# Patient Record
Sex: FEMALE | Race: WHITE | NOT HISPANIC OR LATINO
[De-identification: names, ages, dates, MRNs, and addresses within clinical notes are randomized per-mention and may not be internally consistent; named-entity substitution may affect disease eponyms.]

---

## 2017-01-08 ENCOUNTER — RX RENEWAL (OUTPATIENT)
Age: 57
End: 2017-01-08

## 2017-01-09 ENCOUNTER — RX RENEWAL (OUTPATIENT)
Age: 57
End: 2017-01-09

## 2017-01-17 ENCOUNTER — RX RENEWAL (OUTPATIENT)
Age: 57
End: 2017-01-17

## 2017-02-02 ENCOUNTER — APPOINTMENT (OUTPATIENT)
Dept: NEPHROLOGY | Facility: CLINIC | Age: 57
End: 2017-02-02

## 2017-02-02 VITALS — DIASTOLIC BLOOD PRESSURE: 60 MMHG | HEART RATE: 80 BPM | WEIGHT: 125.4 LBS | SYSTOLIC BLOOD PRESSURE: 126 MMHG

## 2017-02-15 ENCOUNTER — RX RENEWAL (OUTPATIENT)
Age: 57
End: 2017-02-15

## 2017-02-22 ENCOUNTER — RX RENEWAL (OUTPATIENT)
Age: 57
End: 2017-02-22

## 2017-03-01 ENCOUNTER — RX RENEWAL (OUTPATIENT)
Age: 57
End: 2017-03-01

## 2017-03-01 RX ORDER — DIAZEPAM 10 MG/1
10 TABLET ORAL
Refills: 0 | Status: DISCONTINUED | COMMUNITY
End: 2017-03-01

## 2017-03-01 RX ORDER — ZOLPIDEM TARTRATE 5 MG/1
5 TABLET, FILM COATED ORAL
Refills: 0 | Status: DISCONTINUED | COMMUNITY
End: 2017-03-01

## 2017-03-07 ENCOUNTER — RX RENEWAL (OUTPATIENT)
Age: 57
End: 2017-03-07

## 2017-03-07 RX ORDER — ZOLPIDEM TARTRATE 5 MG/1
5 TABLET, FILM COATED ORAL
Refills: 0 | Status: DISCONTINUED | COMMUNITY
End: 2017-03-07

## 2017-03-07 RX ORDER — DIAZEPAM 5 MG/1
5 TABLET ORAL
Refills: 0 | Status: DISCONTINUED | COMMUNITY
End: 2017-03-07

## 2017-04-25 ENCOUNTER — RX RENEWAL (OUTPATIENT)
Age: 57
End: 2017-04-25

## 2017-05-10 ENCOUNTER — RX RENEWAL (OUTPATIENT)
Age: 57
End: 2017-05-10

## 2017-06-16 ENCOUNTER — MEDICATION RENEWAL (OUTPATIENT)
Age: 57
End: 2017-06-16

## 2017-06-16 ENCOUNTER — RX RENEWAL (OUTPATIENT)
Age: 57
End: 2017-06-16

## 2017-07-14 ENCOUNTER — OUTPATIENT (OUTPATIENT)
Dept: OUTPATIENT SERVICES | Facility: HOSPITAL | Age: 57
LOS: 1 days | End: 2017-07-14
Payer: COMMERCIAL

## 2017-07-14 ENCOUNTER — APPOINTMENT (OUTPATIENT)
Dept: NEPHROLOGY | Facility: CLINIC | Age: 57
End: 2017-07-14

## 2017-07-14 VITALS — DIASTOLIC BLOOD PRESSURE: 68 MMHG | SYSTOLIC BLOOD PRESSURE: 128 MMHG | HEART RATE: 82 BPM

## 2017-07-14 DIAGNOSIS — Z00.00 ENCOUNTER FOR GENERAL ADULT MEDICAL EXAMINATION W/OUT ABNORMAL FINDINGS: ICD-10-CM

## 2017-07-14 PROCEDURE — 73700 CT LOWER EXTREMITY W/O DYE: CPT | Mod: 26,LT

## 2017-07-14 PROCEDURE — 73700 CT LOWER EXTREMITY W/O DYE: CPT

## 2017-07-14 RX ORDER — MUPIROCIN 20 MG/G
2 OINTMENT TOPICAL
Qty: 22 | Refills: 0 | Status: DISCONTINUED | COMMUNITY
Start: 2017-05-25

## 2017-07-14 RX ORDER — AMOXICILLIN AND CLAVULANATE POTASSIUM 875; 125 MG/1; MG/1
875-125 TABLET, COATED ORAL
Qty: 20 | Refills: 0 | Status: DISCONTINUED | COMMUNITY
Start: 2017-06-07

## 2017-07-14 RX ORDER — FINASTERIDE 5 MG/1
5 TABLET, FILM COATED ORAL
Refills: 0 | Status: DISCONTINUED | COMMUNITY
End: 2017-07-14

## 2017-07-20 LAB
ALBUMIN SERPL ELPH-MCNC: 4.1 G/DL
ALP BLD-CCNC: 68 U/L
ALT SERPL-CCNC: 6 U/L
ANION GAP SERPL CALC-SCNC: 17 MMOL/L
APPEARANCE: CLEAR
AST SERPL-CCNC: 11 U/L
BACTERIA: NEGATIVE
BASOPHILS # BLD AUTO: 0.03 K/UL
BASOPHILS NFR BLD AUTO: 0.4 %
BILIRUB SERPL-MCNC: 0.3 MG/DL
BILIRUBIN URINE: NEGATIVE
BLOOD URINE: NEGATIVE
BUN SERPL-MCNC: 20 MG/DL
CALCIUM SERPL-MCNC: 9.6 MG/DL
CALCIUM SERPL-MCNC: 9.6 MG/DL
CHLORIDE SERPL-SCNC: 97 MMOL/L
CHOLEST SERPL-MCNC: 169 MG/DL
CHOLEST/HDLC SERPL: 2.2 RATIO
CO2 SERPL-SCNC: 20 MMOL/L
COLOR: YELLOW
CREAT SERPL-MCNC: 1.58 MG/DL
CREAT SPEC-SCNC: 44 MG/DL
CREAT/PROT UR: 0.3 RATIO
EOSINOPHIL # BLD AUTO: 0.08 K/UL
EOSINOPHIL NFR BLD AUTO: 1 %
FERRITIN SERPL-MCNC: 65 NG/ML
FRUCTOSAMINE SERPL-MCNC: 337 UMOL/L
GLUCOSE QUALITATIVE U: NORMAL MG/DL
GLUCOSE SERPL-MCNC: 49 MG/DL
HBA1C MFR BLD HPLC: 6.2 %
HCT VFR BLD CALC: 38.2 %
HDLC SERPL-MCNC: 76 MG/DL
HGB BLD-MCNC: 12.8 G/DL
HYALINE CASTS: 2 /LPF
IMM GRANULOCYTES NFR BLD AUTO: 0.1 %
INR PPP: 0.88 RATIO
IRON SATN MFR SERPL: 28 %
IRON SERPL-MCNC: 79 UG/DL
KETONES URINE: NEGATIVE
LDLC SERPL CALC-MCNC: 81 MG/DL
LEUKOCYTE ESTERASE URINE: NEGATIVE
LYMPHOCYTES # BLD AUTO: 2.14 K/UL
LYMPHOCYTES NFR BLD AUTO: 27 %
MAGNESIUM SERPL-MCNC: 2.1 MG/DL
MAN DIFF?: NORMAL
MCHC RBC-ENTMCNC: 30.5 PG
MCHC RBC-ENTMCNC: 33.5 GM/DL
MCV RBC AUTO: 91.2 FL
MICROSCOPIC-UA: NORMAL
MONOCYTES # BLD AUTO: 0.51 K/UL
MONOCYTES NFR BLD AUTO: 6.4 %
NEUTROPHILS # BLD AUTO: 5.17 K/UL
NEUTROPHILS NFR BLD AUTO: 65.1 %
NITRITE URINE: NEGATIVE
PARATHYROID HORMONE INTACT: 32 PG/ML
PH URINE: 6.5
PHOSPHATE SERPL-MCNC: 3.4 MG/DL
PLATELET # BLD AUTO: 512 K/UL
POTASSIUM SERPL-SCNC: 5.3 MMOL/L
PROT SERPL-MCNC: 7.2 G/DL
PROT UR-MCNC: 13 MG/DL
PROTEIN URINE: NEGATIVE MG/DL
PT BLD: 9.9 SEC
RBC # BLD: 4.19 M/UL
RBC # FLD: 12.6 %
RED BLOOD CELLS URINE: 1 /HPF
SODIUM SERPL-SCNC: 134 MMOL/L
SPECIFIC GRAVITY URINE: 1.01
SQUAMOUS EPITHELIAL CELLS: 2 /HPF
TIBC SERPL-MCNC: 278 UG/DL
TRIGL SERPL-MCNC: 61 MG/DL
TSH SERPL-ACNC: 3.93 UIU/ML
UIBC SERPL-MCNC: 199 UG/DL
URATE SERPL-MCNC: 3.5 MG/DL
UROBILINOGEN URINE: NORMAL MG/DL
WBC # FLD AUTO: 7.94 K/UL
WHITE BLOOD CELLS URINE: 1 /HPF

## 2017-07-28 ENCOUNTER — OUTPATIENT (OUTPATIENT)
Dept: OUTPATIENT SERVICES | Facility: HOSPITAL | Age: 57
LOS: 1 days | Discharge: HOME | End: 2017-07-28

## 2017-08-01 LAB
24R-OH-CALCIDIOL SERPL-MCNC: 45 PG/ML
25(OH)D3 SERPL-MCNC: 54.3 NG/ML

## 2017-08-04 DIAGNOSIS — N18.3 CHRONIC KIDNEY DISEASE, STAGE 3 (MODERATE): ICD-10-CM

## 2017-08-04 DIAGNOSIS — Z79.82 LONG TERM (CURRENT) USE OF ASPIRIN: ICD-10-CM

## 2017-08-04 DIAGNOSIS — I73.9 PERIPHERAL VASCULAR DISEASE, UNSPECIFIED: ICD-10-CM

## 2017-08-04 DIAGNOSIS — F41.8 OTHER SPECIFIED ANXIETY DISORDERS: ICD-10-CM

## 2017-08-04 DIAGNOSIS — E10.319 TYPE 1 DIABETES MELLITUS WITH UNSPECIFIED DIABETIC RETINOPATHY WITHOUT MACULAR EDEMA: ICD-10-CM

## 2017-08-04 DIAGNOSIS — M25.775 OSTEOPHYTE, LEFT FOOT: ICD-10-CM

## 2017-08-04 DIAGNOSIS — I12.9 HYPERTENSIVE CHRONIC KIDNEY DISEASE WITH STAGE 1 THROUGH STAGE 4 CHRONIC KIDNEY DISEASE, OR UNSPECIFIED CHRONIC KIDNEY DISEASE: ICD-10-CM

## 2017-08-04 DIAGNOSIS — E10.22 TYPE 1 DIABETES MELLITUS WITH DIABETIC CHRONIC KIDNEY DISEASE: ICD-10-CM

## 2017-08-04 DIAGNOSIS — E10.610 TYPE 1 DIABETES MELLITUS WITH DIABETIC NEUROPATHIC ARTHROPATHY: ICD-10-CM

## 2017-08-04 DIAGNOSIS — Z79.4 LONG TERM (CURRENT) USE OF INSULIN: ICD-10-CM

## 2017-10-30 ENCOUNTER — RX RENEWAL (OUTPATIENT)
Age: 57
End: 2017-10-30

## 2017-10-30 DIAGNOSIS — S30.821A: ICD-10-CM

## 2017-10-30 DIAGNOSIS — L08.9: ICD-10-CM

## 2017-11-20 ENCOUNTER — APPOINTMENT (OUTPATIENT)
Dept: NEPHROLOGY | Facility: CLINIC | Age: 57
End: 2017-11-20
Payer: COMMERCIAL

## 2017-11-20 VITALS — DIASTOLIC BLOOD PRESSURE: 62 MMHG | HEART RATE: 72 BPM | SYSTOLIC BLOOD PRESSURE: 124 MMHG

## 2017-11-20 PROCEDURE — 99214 OFFICE O/P EST MOD 30 MIN: CPT

## 2017-12-14 ENCOUNTER — RX RENEWAL (OUTPATIENT)
Age: 57
End: 2017-12-14

## 2017-12-19 LAB
24R-OH-CALCIDIOL SERPL-MCNC: 45.1 PG/ML
25(OH)D3 SERPL-MCNC: 41.2 NG/ML
ALBUMIN SERPL ELPH-MCNC: 4.2 G/DL
ALP BLD-CCNC: 64 U/L
ALT SERPL-CCNC: 7 U/L
ANION GAP SERPL CALC-SCNC: 13 MMOL/L
APPEARANCE: CLEAR
AST SERPL-CCNC: 15 U/L
BACTERIA: NEGATIVE
BASOPHILS # BLD AUTO: 0.01 K/UL
BASOPHILS NFR BLD AUTO: 0.1 %
BILIRUB SERPL-MCNC: 0.4 MG/DL
BILIRUBIN URINE: NEGATIVE
BLOOD URINE: NEGATIVE
BUN SERPL-MCNC: 23 MG/DL
CALCIUM SERPL-MCNC: 9.8 MG/DL
CALCIUM SERPL-MCNC: 9.8 MG/DL
CHLORIDE SERPL-SCNC: 97 MMOL/L
CHOLEST SERPL-MCNC: 169 MG/DL
CHOLEST/HDLC SERPL: 2.1 RATIO
CO2 SERPL-SCNC: 26 MMOL/L
COLOR: YELLOW
CREAT SERPL-MCNC: 1.36 MG/DL
CREAT SPEC-SCNC: 37 MG/DL
CREAT/PROT UR: 0.3 RATIO
EOSINOPHIL # BLD AUTO: 0.09 K/UL
EOSINOPHIL NFR BLD AUTO: 1.3
FERRITIN SERPL-MCNC: 47 NG/ML
GLUCOSE QUALITATIVE U: NEGATIVE MG/DL
GLUCOSE SERPL-MCNC: 70 MG/DL
HCT VFR BLD CALC: 37.5 %
HCYS SERPL-MCNC: 14.4 UMOL/L
HDLC SERPL-MCNC: 79 MG/DL
HGB BLD-MCNC: 12.6 G/DL
HYALINE CASTS: 1 /LPF
IMM GRANULOCYTES NFR BLD AUTO: 0.1 %
IRON SATN MFR SERPL: 33 %
IRON SERPL-MCNC: 96 UG/DL
KETONES URINE: NEGATIVE
LDLC SERPL CALC-MCNC: 75 MG/DL
LEUKOCYTE ESTERASE URINE: NEGATIVE
LYMPHOCYTES # BLD AUTO: 1.97 K/UL
LYMPHOCYTES NFR BLD AUTO: 27.5 %
MAGNESIUM SERPL-MCNC: 2 MG/DL
MAN DIFF?: NORMAL
MCHC RBC-ENTMCNC: 30.7 PG
MCHC RBC-ENTMCNC: 33.6 GM/DL
MCV RBC AUTO: 91.5 FL
MICROSCOPIC-UA: NORMAL
MONOCYTES # BLD AUTO: 0.55 K/UL
MONOCYTES NFR BLD AUTO: 7.7 %
NEUTROPHILS # BLD AUTO: 4.53 K/UL
NEUTROPHILS NFR BLD AUTO: 63.3 %
NITRITE URINE: NEGATIVE
PARATHYROID HORMONE INTACT: 22 PG/ML
PH URINE: 6.5
PHOSPHATE SERPL-MCNC: 4.5 MG/DL
PLATELET # BLD AUTO: 450 K/UL
POTASSIUM SERPL-SCNC: 4.8 MMOL/L
PROT SERPL-MCNC: 7.2 G/DL
PROT UR-MCNC: 10 MG/DL
PROTEIN URINE: NEGATIVE MG/DL
RBC # BLD: 4.1 M/UL
RBC # FLD: 12.9 %
RED BLOOD CELLS URINE: 2 /HPF
SODIUM SERPL-SCNC: 136 MMOL/L
SPECIFIC GRAVITY URINE: 1.01
SQUAMOUS EPITHELIAL CELLS: 2 /HPF
TIBC SERPL-MCNC: 289 UG/DL
TRIGL SERPL-MCNC: 73 MG/DL
TSH SERPL-ACNC: 2.65 UIU/ML
UIBC SERPL-MCNC: 193 UG/DL
URATE SERPL-MCNC: 3.4 MG/DL
UROBILINOGEN URINE: NEGATIVE MG/DL
WBC # FLD AUTO: 7.16 K/UL
WHITE BLOOD CELLS URINE: 3 /HPF

## 2018-02-28 ENCOUNTER — RX RENEWAL (OUTPATIENT)
Age: 58
End: 2018-02-28

## 2018-03-01 ENCOUNTER — RX RENEWAL (OUTPATIENT)
Age: 58
End: 2018-03-01

## 2018-03-02 ENCOUNTER — OTHER (OUTPATIENT)
Age: 58
End: 2018-03-02

## 2018-03-23 ENCOUNTER — APPOINTMENT (OUTPATIENT)
Dept: NEPHROLOGY | Facility: CLINIC | Age: 58
End: 2018-03-23
Payer: COMMERCIAL

## 2018-03-23 VITALS — DIASTOLIC BLOOD PRESSURE: 62 MMHG | SYSTOLIC BLOOD PRESSURE: 120 MMHG | HEART RATE: 76 BPM

## 2018-03-23 DIAGNOSIS — L64.9 ANDROGENIC ALOPECIA, UNSPECIFIED: ICD-10-CM

## 2018-03-23 DIAGNOSIS — G62.9 POLYNEUROPATHY, UNSPECIFIED: ICD-10-CM

## 2018-03-23 PROCEDURE — 99214 OFFICE O/P EST MOD 30 MIN: CPT

## 2018-03-23 RX ORDER — DOXYCYCLINE HYCLATE 100 MG/1
100 CAPSULE ORAL
Qty: 28 | Refills: 1 | Status: DISCONTINUED | COMMUNITY
Start: 2017-10-30 | End: 2018-03-23

## 2018-05-25 ENCOUNTER — APPOINTMENT (OUTPATIENT)
Dept: ENDOCRINOLOGY | Facility: CLINIC | Age: 58
End: 2018-05-25
Payer: COMMERCIAL

## 2018-05-25 VITALS
WEIGHT: 125 LBS | DIASTOLIC BLOOD PRESSURE: 72 MMHG | SYSTOLIC BLOOD PRESSURE: 128 MMHG | HEART RATE: 90 BPM | BODY MASS INDEX: 22.15 KG/M2 | HEIGHT: 63 IN

## 2018-05-25 PROCEDURE — 99204 OFFICE O/P NEW MOD 45 MIN: CPT

## 2018-05-25 RX ORDER — INSULIN GLARGINE 100 [IU]/ML
100 INJECTION, SOLUTION SUBCUTANEOUS
Qty: 1 | Refills: 5 | Status: DISCONTINUED | COMMUNITY
Start: 2018-05-25 | End: 2018-05-25

## 2018-05-31 ENCOUNTER — APPOINTMENT (OUTPATIENT)
Dept: PLASTIC SURGERY | Facility: CLINIC | Age: 58
End: 2018-05-31
Payer: COMMERCIAL

## 2018-05-31 VITALS — WEIGHT: 125 LBS | BODY MASS INDEX: 22.15 KG/M2 | HEIGHT: 63 IN

## 2018-05-31 DIAGNOSIS — Z86.39 PERSONAL HISTORY OF OTHER ENDOCRINE, NUTRITIONAL AND METABOLIC DISEASE: ICD-10-CM

## 2018-05-31 DIAGNOSIS — S81.801A UNSPECIFIED OPEN WOUND, RIGHT LOWER LEG, INITIAL ENCOUNTER: ICD-10-CM

## 2018-05-31 PROCEDURE — 99201 OFFICE OUTPATIENT NEW 10 MINUTES: CPT

## 2018-06-06 ENCOUNTER — APPOINTMENT (OUTPATIENT)
Dept: ENDOCRINOLOGY | Facility: CLINIC | Age: 58
End: 2018-06-06

## 2018-06-14 ENCOUNTER — APPOINTMENT (OUTPATIENT)
Dept: PLASTIC SURGERY | Facility: CLINIC | Age: 58
End: 2018-06-14
Payer: COMMERCIAL

## 2018-06-14 VITALS — WEIGHT: 125 LBS | HEIGHT: 63 IN | BODY MASS INDEX: 22.15 KG/M2

## 2018-06-14 PROCEDURE — 99212 OFFICE O/P EST SF 10 MIN: CPT

## 2018-07-05 ENCOUNTER — APPOINTMENT (OUTPATIENT)
Dept: PLASTIC SURGERY | Facility: CLINIC | Age: 58
End: 2018-07-05
Payer: COMMERCIAL

## 2018-07-05 PROCEDURE — 99212 OFFICE O/P EST SF 10 MIN: CPT

## 2018-09-06 ENCOUNTER — APPOINTMENT (OUTPATIENT)
Dept: PLASTIC SURGERY | Facility: CLINIC | Age: 58
End: 2018-09-06

## 2018-09-17 ENCOUNTER — APPOINTMENT (OUTPATIENT)
Dept: NEPHROLOGY | Facility: CLINIC | Age: 58
End: 2018-09-17
Payer: COMMERCIAL

## 2018-09-17 VITALS — HEART RATE: 80 BPM | DIASTOLIC BLOOD PRESSURE: 68 MMHG | SYSTOLIC BLOOD PRESSURE: 114 MMHG

## 2018-09-17 DIAGNOSIS — E10.8 TYPE 1 DIABETES MELLITUS WITH UNSPECIFIED COMPLICATIONS: ICD-10-CM

## 2018-09-17 PROCEDURE — 99214 OFFICE O/P EST MOD 30 MIN: CPT

## 2018-09-17 RX ORDER — INSULIN GLARGINE 100 [IU]/ML
100 INJECTION, SOLUTION SUBCUTANEOUS
Qty: 1 | Refills: 5 | Status: DISCONTINUED | COMMUNITY
Start: 2018-05-25 | End: 2018-09-17

## 2018-10-16 ENCOUNTER — OUTPATIENT (OUTPATIENT)
Dept: OUTPATIENT SERVICES | Facility: HOSPITAL | Age: 58
LOS: 1 days | End: 2018-10-16
Payer: COMMERCIAL

## 2018-10-16 ENCOUNTER — APPOINTMENT (OUTPATIENT)
Dept: INFUSION THERAPY | Facility: HOSPITAL | Age: 58
End: 2018-10-16

## 2018-10-16 ENCOUNTER — APPOINTMENT (OUTPATIENT)
Dept: ENDOCRINOLOGY | Facility: CLINIC | Age: 58
End: 2018-10-16
Payer: COMMERCIAL

## 2018-10-16 VITALS
SYSTOLIC BLOOD PRESSURE: 132 MMHG | TEMPERATURE: 98 F | HEART RATE: 85 BPM | RESPIRATION RATE: 16 BRPM | DIASTOLIC BLOOD PRESSURE: 71 MMHG | OXYGEN SATURATION: 99 %

## 2018-10-16 VITALS
SYSTOLIC BLOOD PRESSURE: 124 MMHG | HEART RATE: 86 BPM | DIASTOLIC BLOOD PRESSURE: 73 MMHG | HEIGHT: 63 IN | WEIGHT: 119.05 LBS | RESPIRATION RATE: 18 BRPM | TEMPERATURE: 99 F | OXYGEN SATURATION: 100 %

## 2018-10-16 VITALS
HEART RATE: 88 BPM | BODY MASS INDEX: 21.09 KG/M2 | HEIGHT: 63 IN | SYSTOLIC BLOOD PRESSURE: 128 MMHG | WEIGHT: 119 LBS | DIASTOLIC BLOOD PRESSURE: 68 MMHG

## 2018-10-16 DIAGNOSIS — D50.9 IRON DEFICIENCY ANEMIA, UNSPECIFIED: ICD-10-CM

## 2018-10-16 DIAGNOSIS — Z23 ENCOUNTER FOR IMMUNIZATION: ICD-10-CM

## 2018-10-16 PROCEDURE — 90686 IIV4 VACC NO PRSV 0.5 ML IM: CPT

## 2018-10-16 PROCEDURE — 96366 THER/PROPH/DIAG IV INF ADDON: CPT

## 2018-10-16 PROCEDURE — 99214 OFFICE O/P EST MOD 30 MIN: CPT | Mod: 25

## 2018-10-16 PROCEDURE — G0008: CPT

## 2018-10-16 PROCEDURE — 96365 THER/PROPH/DIAG IV INF INIT: CPT

## 2018-10-16 RX ORDER — IRON DEXTRAN COMPLEX 100 MG/2ML
975 VIAL (ML) INJECTION ONCE
Qty: 0 | Refills: 0 | Status: COMPLETED | OUTPATIENT
Start: 2018-10-16 | End: 2018-10-16

## 2018-10-16 RX ORDER — INFLUENZA A VIRUS A/VICTORIA/4897/2022 IVR-238 (H1N1) ANTIGEN (FORMALDEHYDE INACTIVATED), INFLUENZA A VIRUS A/DARWIN/9/2021 SAN-010 (H3N2) ANTIGEN (FORMALDEHYDE INACTIVATED), INFLUENZA B VIRUS B/PHUKET/3073/2013 ANTIGEN (FORMALDEHYDE INACTIVATED), AND INFLUENZA B VIRUS B/MICHIGAN/01/2021 ANTIGEN (FORMALDEHYDE INACTIVATED) 15; 15; 15; 15 UG/.5ML; UG/.5ML; UG/.5ML; UG/.5ML
0.5 INJECTION, SUSPENSION INTRAMUSCULAR
Qty: 1 | Refills: 0 | Status: ACTIVE | COMMUNITY
Start: 2018-10-16

## 2018-10-16 RX ORDER — IRON DEXTRAN COMPLEX 100 MG/2ML
25 VIAL (ML) INJECTION ONCE
Qty: 0 | Refills: 0 | Status: COMPLETED | OUTPATIENT
Start: 2018-10-16 | End: 2018-10-16

## 2018-10-16 RX ADMIN — Medication 173.17 MILLIGRAM(S): at 14:49

## 2018-10-16 RX ADMIN — Medication 1002 MILLIGRAM(S): at 13:55

## 2018-10-25 DIAGNOSIS — K90.9 INTESTINAL MALABSORPTION, UNSPECIFIED: ICD-10-CM

## 2018-11-29 ENCOUNTER — MEDICATION RENEWAL (OUTPATIENT)
Age: 58
End: 2018-11-29

## 2018-11-29 RX ORDER — BLOOD SUGAR DIAGNOSTIC
STRIP MISCELLANEOUS
Qty: 300 | Refills: 5 | Status: DISCONTINUED | COMMUNITY
Start: 2018-10-16 | End: 2018-11-29

## 2018-11-29 RX ORDER — BLOOD SUGAR DIAGNOSTIC
STRIP MISCELLANEOUS
Qty: 300 | Refills: 5 | Status: DISCONTINUED | COMMUNITY
Start: 2017-01-17 | End: 2018-11-29

## 2018-12-04 ENCOUNTER — MEDICATION RENEWAL (OUTPATIENT)
Age: 58
End: 2018-12-04

## 2018-12-24 PROBLEM — L64.9 ANDROGENIC ALOPECIA: Status: ACTIVE | Noted: 2018-03-24

## 2019-01-10 ENCOUNTER — OTHER (OUTPATIENT)
Age: 59
End: 2019-01-10

## 2019-01-14 ENCOUNTER — OTHER (OUTPATIENT)
Age: 59
End: 2019-01-14

## 2019-01-14 ENCOUNTER — MEDICATION RENEWAL (OUTPATIENT)
Age: 59
End: 2019-01-14

## 2019-03-06 ENCOUNTER — MEDICATION RENEWAL (OUTPATIENT)
Age: 59
End: 2019-03-06

## 2019-03-13 ENCOUNTER — RX RENEWAL (OUTPATIENT)
Age: 59
End: 2019-03-13

## 2019-03-19 ENCOUNTER — APPOINTMENT (OUTPATIENT)
Dept: ENDOCRINOLOGY | Facility: CLINIC | Age: 59
End: 2019-03-19
Payer: COMMERCIAL

## 2019-03-19 ENCOUNTER — APPOINTMENT (OUTPATIENT)
Dept: NEPHROLOGY | Facility: CLINIC | Age: 59
End: 2019-03-19
Payer: COMMERCIAL

## 2019-03-19 VITALS — SYSTOLIC BLOOD PRESSURE: 128 MMHG | DIASTOLIC BLOOD PRESSURE: 60 MMHG | HEART RATE: 90 BPM

## 2019-03-19 VITALS
HEART RATE: 92 BPM | DIASTOLIC BLOOD PRESSURE: 69 MMHG | HEIGHT: 63 IN | WEIGHT: 119 LBS | SYSTOLIC BLOOD PRESSURE: 130 MMHG | BODY MASS INDEX: 21.09 KG/M2

## 2019-03-19 VITALS — SYSTOLIC BLOOD PRESSURE: 126 MMHG | DIASTOLIC BLOOD PRESSURE: 58 MMHG

## 2019-03-19 PROCEDURE — 99214 OFFICE O/P EST MOD 30 MIN: CPT

## 2019-03-19 PROCEDURE — 99215 OFFICE O/P EST HI 40 MIN: CPT

## 2019-03-19 NOTE — DATA REVIEWED
[FreeTextEntry1] : 3/19: A1c 6.2%, tot chol 172, HDL 77, trig 54, LDL 82, urine microalbumin/cr 83, Cr 1.21\par 7/18: A1c 6.5%, Cr 1.25, tot chol 198, HDL 81, , trig 46, TSH 2.51, ferritin 13\par 4/18: Cr 1.48, Ca 9.9\par 7/17: A1c 6.2%

## 2019-03-19 NOTE — PHYSICAL EXAM
[General Appearance - Alert] : alert [General Appearance - In No Acute Distress] : in no acute distress [Sclera] : the sclera and conjunctiva were normal [Extraocular Movements] : extraocular movements were intact [Outer Ear] : the ears and nose were normal in appearance [Examination Of The Oral Cavity] : the lips and gums were normal [Neck Appearance] : the appearance of the neck was normal [Neck Cervical Mass (___cm)] : no neck mass was observed [Jugular Venous Distention Increased] : there was no jugular-venous distention [Auscultation Breath Sounds / Voice Sounds] : lungs were clear to auscultation bilaterally [Heart Rate And Rhythm] : heart rate was normal and rhythm regular [Heart Sounds] : normal S1 and S2 [Heart Sounds Gallop] : no gallops [Heart Sounds Pericardial Friction Rub] : no pericardial rub [Cervical Lymph Nodes Enlarged Anterior Bilaterally] : anterior cervical [Supraclavicular Lymph Nodes Enlarged Bilaterally] : supraclavicular [No CVA Tenderness] : no ~M costovertebral angle tenderness [No Spinal Tenderness] : no spinal tenderness [Abnormal Walk] : normal gait [] : no rash [Oriented To Time, Place, And Person] : oriented to person, place, and time [Impaired Insight] : insight and judgment were intact [Affect] : the affect was normal [FreeTextEntry1] : reduced sensation feet

## 2019-03-19 NOTE — HISTORY OF PRESENT ILLNESS
[FreeTextEntry1] : was in [facility] ED in December with hypoglycemia.  Since then she let her glucose run 40-50 mg/dl higher than usual and she is regaining some hypoglycemia awareness.  has hypo symptoms when glucose in the 50s.\myah has not had any hypo "pass outs" recently\myah wearing Mariusz CGM, she finds it 10-15 mg/dl lower than her glucometer.  was able to get the 14 day reader at a [facility] in [place] but her [facility] doesn't have the 14d sensors.\par Mariusz CGM reviewed:\par 14d avg   122 .  12a-6a 121;; 6a-12p  138; 12p-6p  121; 6p-12a 101\par 30d avg   125 .  12a-6a 130 ; 6a-12p  138; 12p-6p  118; 6p-12a 111\par range is mostly .  still having hypoglycemia multiple times per week.  But she says the hypo or "LO" reading on Mariusz is not always corroborated with her glucometer.  still testing 10x/day with glucometer because she doesn't fully trust Mariusz readings.  hypo's seem to be usually due to overbolusing.\par wearing boot on L leg.  one day, was walking and foot didn't feel right.  when she got home, there was blood on her sock.  she treated it at home for 2 days and then saw her ortho doctor.  Xray was ok (no collapsed bone). no odor, no fever, no erythema.  has a skin break of a few mm, draining clear/blood tinged fluid. the boot is too big so she has to get a smaller one because it's hard to walk.\par up to date with ophtho\par was getting dizzy, so amlodipine was reduced from 10mg to 5mg, but at last ophtho visit, swollen optic nerve seen, so amlodipine was increased back to 10mg.\par Tresiba vial to be available in April, and she prefers to use vial over pen\par Humalog not on insurance anymore, so she will need prior auth because she got reaction in past with Novolog (welts on skin) when she tried switching a few years ago\par  \par PMH: diabetes with DR (laser to both eyes), CKD and neuropathy, and hypoglycemia\par \par Meds:\par Tresiba 22 units\par Humalog, IC ratio 16 (vial); correction 60-70\par amlodipine 10mg, losartan 100mg\par finasteride 5mg\par aspirin 81mg\par bupropion 150mg bid\par estradiol/norethindrone 0.5/1\par Previously tried: Novolog (rash, weldavid)

## 2019-03-19 NOTE — PHYSICAL EXAM
[Healthy Appearance] : healthy appearance [Alert] : alert [Normal Voice/Communication] : normal voice communication [No Proptosis] : no proptosis [No Lid Lag] : no lid lag [Normal Hearing] : hearing was normal [Thyroid Not Enlarged] : the thyroid was not enlarged [No Thyroid Nodules] : there were no palpable thyroid nodules [Clear to Auscultation] : lungs were clear to auscultation bilaterally [Normal S1, S2] : normal S1 and S2 [Regular Rhythm] : with a regular rhythm [No Edema] : there was no peripheral edema [Normal Affect] : the affect was normal [Normal Mood] : the mood was normal [Acanthosis Nigricans] : no acanthosis nigricans [de-identified] : foot exam deferred, recently saw podiatry

## 2019-03-19 NOTE — ASSESSMENT
[FreeTextEntry1] : reviewed all labs in detail with patient  from 3/14/2019\par  51 yo woman with CKD 3, DMT1, HTN, proteinuria and hyperlipidemia\par \par -HTN: BP okay -- c/w amlodipine at 10 mg\par -CKD 3-- creat  good at 1.2; electrolytes good\par -proteinuria- low grade  c/w losartan\par -insomnia-  reordered  ambien 5 mg- \par -DM- A1C  at goal\par -anxiety- prn valium-  instructed to not use valium and ambien together\par -hyperlipidemia- lipid panel good- c/w statin\par \par f/u 4 months

## 2019-03-19 NOTE — HISTORY OF PRESENT ILLNESS
[FreeTextEntry1] : 52-year-old woman with DMT1, CKD 3, hypertension and  proteinuria, here for f/u evaluation.\par New left foot plantar ulcer- followed by Dr. Llanos- no bone involvement seen at this time- padded boot in place.\par Also with cut in right visual field- bottom portion- Pt felt that may be related to BP so increased amlodipine back to 10 mg - (in Nov, 2018 had reduced to 5 mg)\par episode of hypoglycemia in Dec, 2018 was at a friend's apt 911 called- improved quickly- no recurrence since then- for endo f/u\par Remains on HRT\par  Denies headache visual changes nausea vomiting or diarrhea.\par  Denies Dysuria hematuria flank pain or frothy urine.

## 2019-03-19 NOTE — ASSESSMENT
[FreeTextEntry1] : Long standing type 1 diabetes with multiple complications.  A1c on the low side, and lower than previous, even though she says she is having less hypoglycemia.  Advised pt to use higher goal when correcting for hyperglycemia, to allow for some room, in case of overcorrection.  Recommended using glucose goal of 140-150 when correcting for high sugar, instead of 120mg/dl.  I told her I would like her to have hypo symptoms when glucose are in low 70s.\par she will get back to me with pharmacy to send 14day sensors to and when to send Tresiba vials\par answered all questions\par RTO 6 months

## 2019-03-26 ENCOUNTER — OUTPATIENT (OUTPATIENT)
Dept: OUTPATIENT SERVICES | Facility: HOSPITAL | Age: 59
LOS: 1 days | End: 2019-03-26
Payer: COMMERCIAL

## 2019-03-26 PROCEDURE — 73630 X-RAY EXAM OF FOOT: CPT

## 2019-03-26 PROCEDURE — 73630 X-RAY EXAM OF FOOT: CPT | Mod: 26,LT

## 2019-04-01 ENCOUNTER — MEDICATION RENEWAL (OUTPATIENT)
Age: 59
End: 2019-04-01

## 2019-04-01 RX ORDER — INSULIN DEGLUDEC INJECTION 200 U/ML
INJECTION, SOLUTION SUBCUTANEOUS
Refills: 0 | Status: DISCONTINUED | COMMUNITY
End: 2019-04-01

## 2019-04-16 ENCOUNTER — APPOINTMENT (OUTPATIENT)
Dept: ENDOCRINOLOGY | Facility: CLINIC | Age: 59
End: 2019-04-16

## 2019-05-16 ENCOUNTER — OUTPATIENT (OUTPATIENT)
Dept: OUTPATIENT SERVICES | Facility: HOSPITAL | Age: 59
LOS: 1 days | End: 2019-05-16
Payer: COMMERCIAL

## 2019-05-16 DIAGNOSIS — S91.332A PUNCTURE WOUND WITHOUT FOREIGN BODY, LEFT FOOT, INITIAL ENCOUNTER: ICD-10-CM

## 2019-05-16 LAB
GRAM STN FLD: SIGNIFICANT CHANGE UP
SPECIMEN SOURCE: SIGNIFICANT CHANGE UP

## 2019-05-16 PROCEDURE — 87075 CULTR BACTERIA EXCEPT BLOOD: CPT

## 2019-05-16 PROCEDURE — 87186 SC STD MICRODIL/AGAR DIL: CPT

## 2019-05-16 PROCEDURE — 87070 CULTURE OTHR SPECIMN AEROBIC: CPT

## 2019-05-18 LAB
-  CEFAZOLIN: SIGNIFICANT CHANGE UP
-  CLINDAMYCIN: SIGNIFICANT CHANGE UP
-  ERYTHROMYCIN: SIGNIFICANT CHANGE UP
-  LINEZOLID: SIGNIFICANT CHANGE UP
-  OXACILLIN: SIGNIFICANT CHANGE UP
-  PENICILLIN: SIGNIFICANT CHANGE UP
-  RIFAMPIN: SIGNIFICANT CHANGE UP
-  TRIMETHOPRIM/SULFAMETHOXAZOLE: SIGNIFICANT CHANGE UP
-  VANCOMYCIN: SIGNIFICANT CHANGE UP
CULTURE RESULTS: SIGNIFICANT CHANGE UP
METHOD TYPE: SIGNIFICANT CHANGE UP
ORGANISM # SPEC MICROSCOPIC CNT: SIGNIFICANT CHANGE UP
ORGANISM # SPEC MICROSCOPIC CNT: SIGNIFICANT CHANGE UP
SPECIMEN SOURCE: SIGNIFICANT CHANGE UP

## 2019-05-28 ENCOUNTER — OUTPATIENT (OUTPATIENT)
Dept: OUTPATIENT SERVICES | Facility: HOSPITAL | Age: 59
LOS: 1 days | End: 2019-05-28
Payer: COMMERCIAL

## 2019-05-28 ENCOUNTER — APPOINTMENT (OUTPATIENT)
Dept: CT IMAGING | Facility: HOSPITAL | Age: 59
End: 2019-05-28

## 2019-05-28 PROCEDURE — 73700 CT LOWER EXTREMITY W/O DYE: CPT | Mod: 26,LT

## 2019-05-28 PROCEDURE — 73700 CT LOWER EXTREMITY W/O DYE: CPT

## 2019-05-31 ENCOUNTER — MEDICATION RENEWAL (OUTPATIENT)
Age: 59
End: 2019-05-31

## 2019-06-07 ENCOUNTER — RX RENEWAL (OUTPATIENT)
Age: 59
End: 2019-06-07

## 2019-06-17 ENCOUNTER — MEDICATION RENEWAL (OUTPATIENT)
Age: 59
End: 2019-06-17

## 2019-08-30 ENCOUNTER — RX RENEWAL (OUTPATIENT)
Age: 59
End: 2019-08-30

## 2019-09-06 ENCOUNTER — RX RENEWAL (OUTPATIENT)
Age: 59
End: 2019-09-06

## 2019-10-08 ENCOUNTER — OUTPATIENT (OUTPATIENT)
Dept: OUTPATIENT SERVICES | Facility: HOSPITAL | Age: 59
LOS: 1 days | End: 2019-10-08
Payer: COMMERCIAL

## 2019-10-08 ENCOUNTER — APPOINTMENT (OUTPATIENT)
Dept: CT IMAGING | Facility: HOSPITAL | Age: 59
End: 2019-10-08

## 2019-10-08 PROCEDURE — 73700 CT LOWER EXTREMITY W/O DYE: CPT | Mod: 26,LT

## 2019-10-08 PROCEDURE — 73700 CT LOWER EXTREMITY W/O DYE: CPT

## 2019-10-18 ENCOUNTER — RX RENEWAL (OUTPATIENT)
Age: 59
End: 2019-10-18

## 2019-11-12 ENCOUNTER — APPOINTMENT (OUTPATIENT)
Dept: ENDOCRINOLOGY | Facility: CLINIC | Age: 59
End: 2019-11-12

## 2019-11-14 ENCOUNTER — APPOINTMENT (OUTPATIENT)
Dept: INTERNAL MEDICINE | Facility: CLINIC | Age: 59
End: 2019-11-14

## 2019-11-14 ENCOUNTER — CLINICAL ADVICE (OUTPATIENT)
Age: 59
End: 2019-11-14

## 2019-11-19 VITALS — DIASTOLIC BLOOD PRESSURE: 71 MMHG | HEART RATE: 91 BPM | SYSTOLIC BLOOD PRESSURE: 143 MMHG

## 2020-01-16 ENCOUNTER — RX RENEWAL (OUTPATIENT)
Age: 60
End: 2020-01-16

## 2020-02-20 ENCOUNTER — APPOINTMENT (OUTPATIENT)
Dept: NEPHROLOGY | Facility: CLINIC | Age: 60
End: 2020-02-20
Payer: COMMERCIAL

## 2020-02-20 VITALS
HEIGHT: 63 IN | HEART RATE: 80 BPM | SYSTOLIC BLOOD PRESSURE: 124 MMHG | BODY MASS INDEX: 21.79 KG/M2 | WEIGHT: 123 LBS | DIASTOLIC BLOOD PRESSURE: 60 MMHG

## 2020-02-20 DIAGNOSIS — G47.00 INSOMNIA, UNSPECIFIED: ICD-10-CM

## 2020-02-20 PROCEDURE — 99214 OFFICE O/P EST MOD 30 MIN: CPT

## 2020-02-20 NOTE — ASSESSMENT
[FreeTextEntry1] : reviewed all labs in detail with patient  from 11/8/2019\par  54 yo woman with CKD 3, DMT1, HTN, proteinuria and hyperlipidemia, labs of 11/12/19 reviewed in detail with patient.\par \par note: allergic - welts after using novolog- documented page 17/79 in [facility] med record dated 12/6/2010)\par \par -HTN: BP okay - 124/60 - c/w amlodipine at 10 mg\par -CKD 3 - creat stable  at 1.2; electrolytes acceptable\par - proteinuria- low grade-   c/w losartan\par -insomnia-  reordered  ambien 5 mg - pt has not been taking 5 mg - prefers 2.5 mg \par -DM- A1C  at goal -  6.2%\par -anxiety- prn valium-  instructed to not use valium and ambien together\par -hyperlipidemia- lipid panel good- c/w statin\par \par f/u 4 months

## 2020-02-20 NOTE — PHYSICAL EXAM
[General Appearance - In No Acute Distress] : in no acute distress [General Appearance - Alert] : alert [Extraocular Movements] : extraocular movements were intact [Sclera] : the sclera and conjunctiva were normal [Outer Ear] : the ears and nose were normal in appearance [Examination Of The Oral Cavity] : the lips and gums were normal [Neck Appearance] : the appearance of the neck was normal [Neck Cervical Mass (___cm)] : no neck mass was observed [Auscultation Breath Sounds / Voice Sounds] : lungs were clear to auscultation bilaterally [Jugular Venous Distention Increased] : there was no jugular-venous distention [Heart Rate And Rhythm] : heart rate was normal and rhythm regular [Heart Sounds Gallop] : no gallops [Heart Sounds] : normal S1 and S2 [Heart Sounds Pericardial Friction Rub] : no pericardial rub [Supraclavicular Lymph Nodes Enlarged Bilaterally] : supraclavicular [Cervical Lymph Nodes Enlarged Anterior Bilaterally] : anterior cervical [No CVA Tenderness] : no ~M costovertebral angle tenderness [No Spinal Tenderness] : no spinal tenderness [Abnormal Walk] : normal gait [] : no rash [Oriented To Time, Place, And Person] : oriented to person, place, and time [Impaired Insight] : insight and judgment were intact [Affect] : the affect was normal [Edema] : there was no peripheral edema [FreeTextEntry1] : reduced sensation feet

## 2020-02-20 NOTE — HISTORY OF PRESENT ILLNESS
[FreeTextEntry1] : 53-year-old woman with DMT1, CKD 3, hypertension and  proteinuria, here for f/u evaluation. \par Patient is s/p left foot surgery for removal of bone spur on 11/22/19 - followed by Dr. Llanos - padded boot in place. \par Also with cut in right visual field- bottom portion- Pt felt that may be related to BP  on amlodipine 10 mg .\par Otherwise doing well. \par \par Remains on HRT\par Denies headache visual changes nausea vomiting or diarrhea.\par Denies Dysuria hematuria flank pain or frothy urine.

## 2020-02-26 ENCOUNTER — APPOINTMENT (OUTPATIENT)
Dept: ENDOCRINOLOGY | Facility: CLINIC | Age: 60
End: 2020-02-26
Payer: COMMERCIAL

## 2020-02-26 VITALS
DIASTOLIC BLOOD PRESSURE: 72 MMHG | HEART RATE: 89 BPM | WEIGHT: 124 LBS | BODY MASS INDEX: 21.97 KG/M2 | SYSTOLIC BLOOD PRESSURE: 135 MMHG

## 2020-02-26 PROCEDURE — 99215 OFFICE O/P EST HI 40 MIN: CPT

## 2020-02-26 RX ORDER — DIAZEPAM 5 MG/1
5 TABLET ORAL
Qty: 60 | Refills: 0 | Status: DISCONTINUED | COMMUNITY
Start: 2017-03-07 | End: 2020-02-26

## 2020-02-26 NOTE — HISTORY OF PRESENT ILLNESS
[FreeTextEntry1] : \par Mariusz CGM reviewed:\par 14d avg   114 .  12a-6a 125;; 6a-12p  116; 12p-6p  102; 6p-12a 111\par 30d avg   109 .  12a-6a 115 ; 6a-12p  104; 12p-6p  100; 6p-12a 115\par target 67% ().  24% hypo, 9% hyper\par having hypoglycemia (< 60) nearly every day.  lows tends to be in the afternoon (2-4p) or early morning (4-6am).  She says this is her "witching hour", and glucose always seemed to go low in the early morning.\par still testing 10x/day to confirm low glucose readings and Mariusz readings are 10-20 mg/dl off from fingersticks.\myah has hypo awareness.  lives alone, so doesn't have anyone to give glucagon (if she needed it)\par weight stable.  she refused weight today because doesn't feel steady on her feet\myah has no appetite\par no polyuria, polydipsia, SOB, chest pain, or neuropathy symptoms \myah has not seen ophtho recently.  subway stop where ophtho office is was closed.\myah had surgery for bone spur in November 2019.  needs podiatrist who takes her insurance who will be able to cut her toenails. Podiatrist at [facility] does not take her insurance.  has not had nails cut in a year.\par  \par PMH: diabetes with DR (laser to both eyes), CKD and neuropathy, and hypoglycemia\par \par Meds:\par Tresiba 22 units\par Humalog, IC ratio 17 (vial); correction 70\par Mariusz CGM ([employer] not covered)\par amlodipine 10mg, losartan 100mg\par finasteride 5mg\par aspirin 81mg\par bupropion 150mg bid\par estradiol/norethindrone 0.5/1\par Previously tried: Novolog (rash, welts)

## 2020-02-26 NOTE — PHYSICAL EXAM
[Alert] : alert [No Proptosis] : no proptosis [Healthy Appearance] : healthy appearance [Normal Voice/Communication] : normal voice communication [No Lid Lag] : no lid lag [Normal Hearing] : hearing was normal [No Thyroid Nodules] : there were no palpable thyroid nodules [Thyroid Not Enlarged] : the thyroid was not enlarged [Regular Rhythm] : with a regular rhythm [Clear to Auscultation] : lungs were clear to auscultation bilaterally [Normal S1, S2] : normal S1 and S2 [No Edema] : there was no peripheral edema [Normal Sensation on Monofilament Testing] : normal sensation on monofilament testing of lower extremities [Normal Affect] : the affect was normal [Normal Mood] : the mood was normal [Foot Ulcers] : no foot ulcers [Acanthosis Nigricans] : no acanthosis nigricans [de-identified] : L foot in soft boot.  toes deviated to side

## 2020-02-26 NOTE — DATA REVIEWED
[FreeTextEntry1] : 11/19: A1c 6.2%, GFR 48, 25D 43\par 3/19: A1c 6.2%, tot chol 172, HDL 77, trig 54, LDL 82, urine microalbumin/cr 83, Cr 1.21\par 7/18: A1c 6.5%, Cr 1.25, tot chol 198, HDL 81, , trig 46, TSH 2.51, ferritin 13\par 4/18: Cr 1.48, Ca 9.9\par 7/17: A1c 6.2%

## 2020-02-26 NOTE — ASSESSMENT
[FreeTextEntry1] : Long standing type 1 diabetes with multiple complications.   Having frequent hypoglycemia, nearly every day.\par Cautioned about frequent hypoglycemia, which can lead to hypoglycemia unawareness. \par Reduce Tresiba to 20 units/day.\par continue frequent glucose monitoring, need to confirm low glucose readings with capillary glucose testing (fingersticks).\par referrals given for cardiology and podiatry\par ophtho recommended.\par RTO 6 months

## 2020-04-14 ENCOUNTER — RX RENEWAL (OUTPATIENT)
Age: 60
End: 2020-04-14

## 2020-05-14 ENCOUNTER — RX RENEWAL (OUTPATIENT)
Age: 60
End: 2020-05-14

## 2020-09-10 ENCOUNTER — APPOINTMENT (OUTPATIENT)
Dept: NEPHROLOGY | Facility: CLINIC | Age: 60
End: 2020-09-10
Payer: COMMERCIAL

## 2020-09-10 ENCOUNTER — APPOINTMENT (OUTPATIENT)
Dept: ENDOCRINOLOGY | Facility: CLINIC | Age: 60
End: 2020-09-10
Payer: COMMERCIAL

## 2020-09-10 VITALS
SYSTOLIC BLOOD PRESSURE: 118 MMHG | HEART RATE: 86 BPM | DIASTOLIC BLOOD PRESSURE: 74 MMHG | WEIGHT: 124 LBS | BODY MASS INDEX: 21.97 KG/M2

## 2020-09-10 VITALS — HEART RATE: 85 BPM | SYSTOLIC BLOOD PRESSURE: 120 MMHG | DIASTOLIC BLOOD PRESSURE: 62 MMHG

## 2020-09-10 DIAGNOSIS — F41.9 ANXIETY DISORDER, UNSPECIFIED: ICD-10-CM

## 2020-09-10 PROCEDURE — 99214 OFFICE O/P EST MOD 30 MIN: CPT | Mod: 25

## 2020-09-10 PROCEDURE — G0008: CPT

## 2020-09-10 PROCEDURE — 99214 OFFICE O/P EST MOD 30 MIN: CPT

## 2020-09-10 PROCEDURE — 90686 IIV4 VACC NO PRSV 0.5 ML IM: CPT

## 2020-09-10 NOTE — HISTORY OF PRESENT ILLNESS
[FreeTextEntry1] : 53-year-old woman here for f/u evaluation of CKD 3, DMT1, hypertension and  proteinuria.\par left foot healing- in a foot boot- just received orthotics -delayed by COVID pandemic\par  not dizzy-\par being followed by neuro-ophtho- for intermittent cut in visual field left lower \par Remains on HRT\par Denies headache visual changes nausea vomiting or diarrhea.\par Denies Dysuria hematuria flank pain or frothy urine.

## 2020-09-10 NOTE — PHYSICAL EXAM
[Alert] : alert [Healthy Appearance] : healthy appearance [No Proptosis] : no proptosis [No Lid Lag] : no lid lag [Normal Hearing] : hearing was normal [No LAD] : no lymphadenopathy [Thyroid Not Enlarged] : the thyroid was not enlarged [Clear to Auscultation] : lungs were clear to auscultation bilaterally [Normal S1, S2] : normal S1 and S2 [Regular Rhythm] : with a regular rhythm [No Edema] : no peripheral edema [Normal Affect] : the affect was normal [Normal Mood] : the mood was normal [Acanthosis Nigricans] : no acanthosis nigricans [de-identified] : L foot in boot

## 2020-09-10 NOTE — DATA REVIEWED
[FreeTextEntry1] : 6/20: A1c 6.1%, tot chol 173, trig 55, HDL 78, LDL 81, Cr 1.18, 25D 35\par 11/19: A1c 6.2%, GFR 48, 25D 43\par 3/19: A1c 6.2%, tot chol 172, HDL 77, trig 54, LDL 82, urine microalbumin/cr 83, Cr 1.21\par 7/18: A1c 6.5%, Cr 1.25, tot chol 198, HDL 81, , trig 46, TSH 2.51, ferritin 13\par 4/18: Cr 1.48, Ca 9.9\par 7/17: A1c 6.2%

## 2020-09-10 NOTE — ASSESSMENT
[FreeTextEntry1] : reviewed all labs in detail with patient  from 6/12/2020\par  54 yo woman with CKD 3, DMT1, HTN, proteinuria and hyperlipidemia, labs of 11/12/19 reviewed in detail with patient.\par \par (note: allergic - welts after using novolog- documented page 17/79 in Hx med record dated 12/6/2010)\par \par -HTN: BP okay - 124/60 - c/w amlodipine at 10 mg\par -CKD 3 - creat stable  at 1.1; electrolytes WNL\par - proteinuria- controlled c/w losartan\par -insomnia-  reordered  ambien 5 mg - pt has not been taking 5 mg - prefers 2.5 mg \par -DM- A1C  at goal -  6.1%\par -anxiety- prn valium-  instructed to not use valium and ambien together\par -hyperlipidemia- controlled-  c/w statin\par \par nephrologically stable\par f/u 9-12 months

## 2020-09-10 NOTE — PHYSICAL EXAM
[General Appearance - Alert] : alert [General Appearance - In No Acute Distress] : in no acute distress [Extraocular Movements] : extraocular movements were intact [Sclera] : the sclera and conjunctiva were normal [Examination Of The Oral Cavity] : the lips and gums were normal [Outer Ear] : the ears and nose were normal in appearance [Jugular Venous Distention Increased] : there was no jugular-venous distention [Neck Appearance] : the appearance of the neck was normal [Neck Cervical Mass (___cm)] : no neck mass was observed [Auscultation Breath Sounds / Voice Sounds] : lungs were clear to auscultation bilaterally [Heart Rate And Rhythm] : heart rate was normal and rhythm regular [Heart Sounds Gallop] : no gallops [Heart Sounds] : normal S1 and S2 [Heart Sounds Pericardial Friction Rub] : no pericardial rub [Supraclavicular Lymph Nodes Enlarged Bilaterally] : supraclavicular [Cervical Lymph Nodes Enlarged Anterior Bilaterally] : anterior cervical [Edema] : there was no peripheral edema [Abnormal Walk] : normal gait [No Spinal Tenderness] : no spinal tenderness [No CVA Tenderness] : no ~M costovertebral angle tenderness [] : no rash [Oriented To Time, Place, And Person] : oriented to person, place, and time [Impaired Insight] : insight and judgment were intact [Affect] : the affect was normal [FreeTextEntry1] : reduced sensation feet

## 2020-09-10 NOTE — ASSESSMENT
[FreeTextEntry1] : Long standing type 1 diabetes with multiple complications.   \par Advised to reduce Tresiba further, until she does not have morning/early am hypoglycemia.  Explained that she should not have to eat to maintain a normal glucose (then basal insulin is too high).  Ok to skip meals and take Humalog at the time she eats.\par coupon given for Humalog, samples also given\par flu vaccine given, L deltoid\par RTO 6-9 months

## 2020-09-10 NOTE — HISTORY OF PRESENT ILLNESS
[FreeTextEntry1] : \par Mariusz CGM reviewed:\par 14d avg   101, 62% in target, 25% low\par 30d avg   110, 59% in target, 22% low, 19% high\par daily graph showing lows in the early morning/middle of night.  doesn't always feel when sugar is low, cher in the middle of the night\par has no appetite\par no polyuria, polydipsia, SOB, chest pain, or neuropathy symptoms \par still wearing boot on L leg -- wears when she goes out walking, but has orthotics for at home. (bone spur removed Nov 2019)\par saw ophtho Jan 2020  DR stable\par  \par PMH: diabetes with DR (laser to both eyes), CKD and neuropathy, and hypoglycemia\par \par Meds:\par Tresiba 16 units\par Humalog, IC ratio 17 (vial); correction 70\par Mariusz CGM (Dexcom not covered)\par amlodipine 10mg, losartan 100mg\par finasteride 5mg\par aspirin 81mg\par bupropion 150mg bid -- stopped due to quasiness\par estradiol/norethindrone 0.5/1\par Previously tried: Novolog (rash, weldavid)

## 2021-04-12 ENCOUNTER — APPOINTMENT (OUTPATIENT)
Dept: NEPHROLOGY | Facility: CLINIC | Age: 61
End: 2021-04-12

## 2021-06-10 ENCOUNTER — APPOINTMENT (OUTPATIENT)
Dept: ENDOCRINOLOGY | Facility: CLINIC | Age: 61
End: 2021-06-10
Payer: COMMERCIAL

## 2021-06-10 VITALS
HEART RATE: 91 BPM | BODY MASS INDEX: 22.5 KG/M2 | DIASTOLIC BLOOD PRESSURE: 70 MMHG | HEIGHT: 63 IN | SYSTOLIC BLOOD PRESSURE: 127 MMHG | WEIGHT: 127 LBS

## 2021-06-10 PROCEDURE — 95249 CONT GLUC MNTR PT PROV EQP: CPT

## 2021-06-10 PROCEDURE — 99214 OFFICE O/P EST MOD 30 MIN: CPT | Mod: 25

## 2021-06-10 NOTE — ASSESSMENT
[FreeTextEntry1] : Long standing type 1 diabetes with hypoglycemia and  multiple complications.   \par Advised to reduce Tresiba further, to 9 units/day to prevent am hypoglycemia\par Mariusz 2 reader given, so she can use the Libre2 sensors she has, and she may find the alarms useful (though she is worried they will go off too often since Mariusz usually reads lower glucose than the fingerstick).  Continue frequent glucose monitoring to determine insulin dosing and monitor for hypoglycemia.  Glucagon kit rx'd. \par samples given for Humalog, Lyumjev and Tresiba.  Can try Lyumjev, see if she likes the more rapid onset.\par she will do labs at [facility].\par RTO 6-9 months

## 2021-06-10 NOTE — PHYSICAL EXAM
[Alert] : alert [Healthy Appearance] : healthy appearance [No Proptosis] : no proptosis [No Lid Lag] : no lid lag [Normal Hearing] : hearing was normal [No LAD] : no lymphadenopathy [Thyroid Not Enlarged] : the thyroid was not enlarged [Clear to Auscultation] : lungs were clear to auscultation bilaterally [Normal S1, S2] : normal S1 and S2 [Regular Rhythm] : with a regular rhythm [No Edema] : no peripheral edema [Normal Affect] : the affect was normal [Normal Mood] : the mood was normal [Acanthosis Nigricans] : no acanthosis nigricans [de-identified] : foot exam deferred since she sees podiatry frequently

## 2021-06-10 NOTE — HISTORY OF PRESENT ILLNESS
[FreeTextEntry1] : Mariusz CGM report  reviewed: 14d average 147.  60% in range, 25% high, 15% low.  47% CGM use\par Daily graph showing  low sugars in the morning (multiple mornings)  spike mid-day from breakfast or lunch.\par usually skips breakfast but when she was home in [place], mom would want her to eat.\par She had trouble getting her insulin while in [place] and then she got Mariusz 2 sensors instead of 14day sensors\par Since getting her new vial of Tresiba, sugars have been going too low some mornings.\par About two weeks ago, she had severe hypoglycemia, requiring mother to call EMS.  She reduced Tresiba to 11 units, but CGM still shows low in the mornings this week.\par She is walking but slowly, not enough to explain the lower sugars.  She is still has pain with walking, since her foot surgery.\par has not seen ophtho recently\par no neuropathy symptoms\par seeing podiatry frequently \par  \par PMH: diabetes with DR (laser to both eyes), CKD and neuropathy, and hypoglycemia\par \par Meds:\par Tresiba 11 units\par Humalog, IC ratio 17 (vial); correction 70\par Mariusz CGM (Dexcom not covered)\par amlodipine 10mg, losartan 100mg\par finasteride 5mg\par aspirin 81mg\par bupropion 150mg bid -- stopped due to quasiness\par estradiol/norethindrone 0.5/1\par Previously tried: Novolog (rash, welts), Fiasp (welts)

## 2022-03-01 ENCOUNTER — APPOINTMENT (OUTPATIENT)
Dept: ENDOCRINOLOGY | Facility: CLINIC | Age: 62
End: 2022-03-01

## 2022-09-09 ENCOUNTER — APPOINTMENT (OUTPATIENT)
Dept: NEPHROLOGY | Facility: CLINIC | Age: 62
End: 2022-09-09

## 2022-10-26 ENCOUNTER — APPOINTMENT (OUTPATIENT)
Dept: ENDOCRINOLOGY | Facility: CLINIC | Age: 62
End: 2022-10-26

## 2022-10-26 VITALS — DIASTOLIC BLOOD PRESSURE: 67 MMHG | SYSTOLIC BLOOD PRESSURE: 117 MMHG | HEART RATE: 96 BPM

## 2022-10-26 PROCEDURE — 90686 IIV4 VACC NO PRSV 0.5 ML IM: CPT

## 2022-10-26 PROCEDURE — G0008: CPT

## 2022-10-26 PROCEDURE — 99215 OFFICE O/P EST HI 40 MIN: CPT | Mod: 25

## 2022-10-26 PROCEDURE — 95249 CONT GLUC MNTR PT PROV EQP: CPT

## 2022-10-26 RX ORDER — ZOLPIDEM TARTRATE 5 MG/1
5 TABLET ORAL
Qty: 30 | Refills: 0 | Status: DISCONTINUED | COMMUNITY
Start: 2017-03-07 | End: 2022-10-26

## 2022-10-26 RX ORDER — ESTRADIOL AND NORETHINDRONE ACETATE .5; .1 MG/1; MG/1
0.5-0.1 TABLET, FILM COATED ORAL
Refills: 0 | Status: DISCONTINUED | COMMUNITY
End: 2022-10-26

## 2022-10-26 RX ORDER — SYRING-NEEDL,DISP,INSUL,0.3 ML 31 GX5/16"
31G X 5/16" SYRINGE, EMPTY DISPOSABLE MISCELLANEOUS
Qty: 200 | Refills: 2 | Status: DISCONTINUED | COMMUNITY
Start: 2019-10-18 | End: 2022-10-26

## 2022-10-26 RX ORDER — INSULIN LISPRO 100 [IU]/ML
100 INJECTION, SOLUTION INTRAVENOUS; SUBCUTANEOUS
Qty: 10 | Refills: 5 | Status: DISCONTINUED | COMMUNITY
Start: 2020-04-03 | End: 2022-10-26

## 2022-10-27 NOTE — ASSESSMENT
[FreeTextEntry1] : Long standing type 1 diabetes with hypoglycemia and  multiple complications.   \par Advised to reduce Tresiba to 10 units to prevent hypoglycemia.   She may be able to regain hypoglycemia awareness if she has less frequent hypoglycemia.  A1c is also too low, indicating frequent hypoglycemia.\par Continue frequent glucose monitoring to determine insulin dosing and monitor for hypoglycemia. \par she will do labs at [facility] in December\par flu vaccine given.\par \par Osteopenia.  s/p multiple minor fractures.  FRAX score is below treatment threshold.\par RTO 12 months

## 2022-10-27 NOTE — PHYSICAL EXAM
[Alert] : alert [No Acute Distress] : no acute distress [No Proptosis] : no proptosis [No Lid Lag] : no lid lag [Normal Hearing] : hearing was normal [No LAD] : no lymphadenopathy [Thyroid Not Enlarged] : the thyroid was not enlarged [Clear to Auscultation] : lungs were clear to auscultation bilaterally [Normal S1, S2] : normal S1 and S2 [Regular Rhythm] : with a regular rhythm [No Edema] : no peripheral edema [Normal Affect] : the affect was normal [Normal Mood] : the mood was normal [Acanthosis Nigricans] : no acanthosis nigricans [de-identified] : using walker.  L foot in boot [de-identified] : foot exam deferred

## 2022-10-27 NOTE — HISTORY OF PRESENT ILLNESS
[FreeTextEntry1] : Mariusz CGM report  reviewed: 14d average 109.  76% in range, 3% high, 18% low.  97% CGM use\par Daily graph showing  low sugars in the morning (multiple mornings)  and low in afternoon/early evening (before dinner).\par Mariusz alarms her when sugar is low, because otherwise she would not have any symptoms. \par Fiasp works too quickly for her because she is a slow eater, so she doses it after the meal.\par Admitted to [facility] in Sept for fracture in foot.  there was a stress fracture in the same foot years ago and it wasn't set properly.  SHe has wrist fracture 15 years ago.  She tried alendronate in 2004 but developed LE swelling. \par she has seen ophtho\par no neuropathy symptoms\par seeing podiatry frequently \par  bone density report reviewed: osteopenia.   she does not have high FRAX score\par PMH: diabetes with DR (laser to both eyes), CKD and neuropathy, and hypoglycemia\par fracture in foot, wrist fracture.\par \par Meds:\par Tresiba 11 units\par Humalog, IC ratio 17 (vial); correction 70  (Humalog not covered, but she tries to trade Fiasp for Humalog when she can)\par Mariusz 2 CGM (Dexcom not covered)\par amlodipine 10mg, losartan 100mg\par finasteride 5mg\par aspirin 81mg\par Previously tried: Novolog (rash, welts), statins (aches, pains), alendronate (swelling)

## 2022-10-27 NOTE — DATA REVIEWED
[FreeTextEntry1] : 5/22 A1c 5.7%, tot chol 218, HDL 90, trig 65, , GFR 54, TSH 2.40, 25D 42\par 9/21 A1c 6.0%, tot chol 195, HDL 79, trig 68, , urine alb/cr 137, TSH 2.59, 25D 32\par 6/20: A1c 6.1%, tot chol 173, trig 55, HDL 78, LDL 81, Cr 1.18, 25D 35\par 11/19: A1c 6.2%, GFR 48, 25D 43\par 3/19: A1c 6.2%, tot chol 172, HDL 77, trig 54, LDL 82, urine microalbumin/cr 83, Cr 1.21\par 7/18: A1c 6.5%, Cr 1.25, tot chol 198, HDL 81, , trig 46, TSH 2.51, ferritin 13\par 4/18: Cr 1.48, Ca 9.9\par 7/17: A1c 6.2%\par \par bone density, 9/21:\par L1-L4  T -0.3, prev -0.8 (2018), 0.0 (2016), -0.3 (2010), -1.3 (2001)\par fem neck T -1.5, \par tot hip T -1.0, prev -0.9 (2018), -1.1 (2016), -0.6 (2010)\par 1/3 radius  T -0.4\par FRAX score (prev fx):  13% fx risk, 1.3% hip fx risk

## 2022-11-28 RX ORDER — INSULIN ASPART INJECTION 100 [IU]/ML
100 INJECTION, SOLUTION SUBCUTANEOUS
Qty: 1 | Refills: 1 | Status: DISCONTINUED | COMMUNITY
Start: 2020-08-13 | End: 2022-11-28

## 2023-01-31 ENCOUNTER — APPOINTMENT (OUTPATIENT)
Dept: NEPHROLOGY | Facility: CLINIC | Age: 63
End: 2023-01-31
Payer: COMMERCIAL

## 2023-01-31 DIAGNOSIS — R42 DIZZINESS AND GIDDINESS: ICD-10-CM

## 2023-01-31 DIAGNOSIS — F32.A DEPRESSION, UNSPECIFIED: ICD-10-CM

## 2023-01-31 PROCEDURE — 99443: CPT

## 2023-01-31 NOTE — ASSESSMENT
[FreeTextEntry1] : reviewed all labs in detail with patient  from 10/2022- she fill mail hard copy\par creat stable\par 63 yo woman with CKD 3, DMT1, HTN, proteinuria, hyperlipidemia and depression\par - HTN- within range when visiting other MDs\par will obtain home BP monitor- may need Rx for insurance reimbursement- she will let me know\par -CKD 3- creat stable\par -proteinuria- c/w RASi- needs repeat Uprc\par -depression- renewed bupropion now\par -DMT1- A1c excellent has close f/u with Dr. Adrian\par -HLD- controlled with statin\par \par (note: allergic - welts after using novolog- documented page 17/79 in Hx med record dated 12/6/2010)\par \par \par f/u 4/4/2023- will be in city to see endo on that date\par bring home BP monitor with her

## 2023-01-31 NOTE — REASON FOR VISIT
[Home] : at home, [unfilled] , at the time of the visit. [Medical Office: ([facility])___] : at the medical office located in  [Verbal consent obtained from patient] : the patient, [unfilled] [Follow-Up] : a follow-up visit [FreeTextEntry1] : for CKD 3, HTN, proteinuria

## 2023-01-31 NOTE — HISTORY OF PRESENT ILLNESS
[FreeTextEntry1] : 63 yo woman with CKD 3, DMT1, hypertension and  proteinuria, for follow up evaluation.\par + PAD- left foot healed- starting PT in 2 weeks\par /60s range\par a1c 5.7%\par continues neuro-ophtho follow up- all stabilized\par balance issues an issue- concern for positional vertigo- for Pt for this as well\par also ran out of bupropion and has been feeling very tearful at times- coping- I will renew now while she transitions to new PCP\par Denies headache visual changes nausea vomiting or diarrhea.\par Denies Dysuria hematuria flank pain or frothy urine.

## 2023-02-08 ENCOUNTER — RX RENEWAL (OUTPATIENT)
Age: 63
End: 2023-02-08

## 2023-03-08 ENCOUNTER — NON-APPOINTMENT (OUTPATIENT)
Age: 63
End: 2023-03-08

## 2023-03-27 ENCOUNTER — RX RENEWAL (OUTPATIENT)
Age: 63
End: 2023-03-27

## 2023-03-29 ENCOUNTER — APPOINTMENT (OUTPATIENT)
Dept: NEPHROLOGY | Facility: CLINIC | Age: 63
End: 2023-03-29

## 2023-04-04 ENCOUNTER — APPOINTMENT (OUTPATIENT)
Dept: NEPHROLOGY | Facility: CLINIC | Age: 63
End: 2023-04-04

## 2023-04-04 ENCOUNTER — APPOINTMENT (OUTPATIENT)
Dept: ENDOCRINOLOGY | Facility: CLINIC | Age: 63
End: 2023-04-04
Payer: COMMERCIAL

## 2023-04-04 VITALS
BODY MASS INDEX: 22.32 KG/M2 | SYSTOLIC BLOOD PRESSURE: 119 MMHG | DIASTOLIC BLOOD PRESSURE: 54 MMHG | WEIGHT: 126 LBS | HEART RATE: 89 BPM

## 2023-04-04 PROCEDURE — 99214 OFFICE O/P EST MOD 30 MIN: CPT | Mod: 25

## 2023-04-04 PROCEDURE — 95249 CONT GLUC MNTR PT PROV EQP: CPT

## 2023-04-04 NOTE — PHYSICAL EXAM
[Alert] : alert [No Acute Distress] : no acute distress [No Proptosis] : no proptosis [No Lid Lag] : no lid lag [Normal Hearing] : hearing was normal [No LAD] : no lymphadenopathy [Thyroid Not Enlarged] : the thyroid was not enlarged [Clear to Auscultation] : lungs were clear to auscultation bilaterally [Normal S1, S2] : normal S1 and S2 [Regular Rhythm] : with a regular rhythm [No Edema] : no peripheral edema [Normal Sensation on Monofilament Testing] : normal sensation on monofilament testing of lower extremities [Normal Affect] : the affect was normal [Normal Mood] : the mood was normal [Acanthosis Nigricans] : no acanthosis nigricans [de-identified] : using walker.  (+) foot deformities

## 2023-04-04 NOTE — DATA REVIEWED
[FreeTextEntry1] : 3/23  A1c 6.2%, tot chol 188, HDL 77, trig 62, LDL 97, GFR 46, 25D 34\par 5/22 A1c 5.7%, tot chol 218, HDL 90, trig 65, , GFR 54, TSH 2.40, 25D 42\par 9/21 A1c 6.0%, tot chol 195, HDL 79, trig 68, , urine alb/cr 137, TSH 2.59, 25D 32\par 6/20: A1c 6.1%, tot chol 173, trig 55, HDL 78, LDL 81, Cr 1.18, 25D 35\par 11/19: A1c 6.2%, GFR 48, 25D 43\par 3/19: A1c 6.2%, tot chol 172, HDL 77, trig 54, LDL 82, urine microalbumin/cr 83, Cr 1.21\par 7/18: A1c 6.5%, Cr 1.25, tot chol 198, HDL 81, , trig 46, TSH 2.51, ferritin 13\par 4/18: Cr 1.48, Ca 9.9\par 7/17: A1c 6.2%\par \par bone density, 9/21:\par L1-L4  T -0.3, prev -0.8 (2018), 0.0 (2016), -0.3 (2010), -1.3 (2001)\par fem neck T -1.5, \par tot hip T -1.0, prev -0.9 (2018), -1.1 (2016), -0.6 (2010)\par 1/3 radius  T -0.4\par FRAX score (prev fx):  13% fx risk, 1.3% hip fx risk

## 2023-04-04 NOTE — ASSESSMENT
[FreeTextEntry1] : Long standing type 1 diabetes with hypoglycemia and  multiple complications.   \par Advised to reduce Tresiba to 10 units (and 11 on the 4ht day, if increase is needed)  to prevent hypoglycemia.  Glucose goals reviewed: pre meal goal < 120 and post meal goal < 160 (not 100).\par Do not correct for hyperglycemia until glucose is over 180 (she is currently doing this) and correct to 150 (not 100). \par Suggested trying juice to correct for low sugars (will work faster).\par Continue frequent glucose monitoring to determine insulin dosing and monitor for hypoglycemia. \par \par Osteopenia.  s/p multiple minor fractures.  FRAX score is below treatment threshold.\par RTO  9-12 months

## 2023-04-04 NOTE — HISTORY OF PRESENT ILLNESS
[FreeTextEntry1] : Mariusz CGM report  reviewed: 14d average 118.  69% in range, 14% high, 17% low.  97% CGM use\par Daily graph showing  low sugars in the morning and late night\par Hypo awareness is coming back, even though she is having hypoglycemia 4 days a week\par mariusz reading 15 mg/dl lower than her glucose meter.  Some times hypoglycemia take a while to correct (she stays low for more than an hour).  She uses Sweet Tarts and jellybeans to correct hypoglycemia.\par SHe started PT in mid February, goes 2x/week\par She will see ophtho in 2 months\par She went to wound care clinic yesterday and was rx'd antibiotics but the doses may be too high for her, due to CKD.  She will discuss with Dr Lucia.   Shoe was rubbing on her foot and opened a wound.\par no chest pain or SOB\par labs reviewed from 3/23:  A1c 6.2%\par \par PMH: diabetes with DR (laser to both eyes), CKD and neuropathy, and hypoglycemia\par fracture in foot, wrist fracture.\par \par Meds:\par Tresiba 11-12 units (every 4th day, take 12 units)\par Humalog, IC ratio 17 (vial); correction 70  (Humalog not covered, but she tries to trade Fiasp for Humalog when she can)\par Mariusz 2 CGM ([employer] not covered)\par amlodipine 10mg, losartan 100mg\par finasteride 5mg\par aspirin 81mg\par Previously tried: Novolog (rash, welts), statins (aches, pains), alendronate (swelling)

## 2023-05-07 ENCOUNTER — RX RENEWAL (OUTPATIENT)
Age: 63
End: 2023-05-07

## 2023-07-07 ENCOUNTER — RX RENEWAL (OUTPATIENT)
Age: 63
End: 2023-07-07

## 2023-09-13 RX ORDER — INSULIN ASPART INJECTION 100 [IU]/ML
100 INJECTION, SOLUTION SUBCUTANEOUS
Qty: 1 | Refills: 5 | Status: ACTIVE | COMMUNITY
Start: 2022-11-28 | End: 1900-01-01

## 2023-10-04 ENCOUNTER — RX RENEWAL (OUTPATIENT)
Age: 63
End: 2023-10-04

## 2023-11-15 ENCOUNTER — RX RENEWAL (OUTPATIENT)
Age: 63
End: 2023-11-15

## 2023-12-08 RX ORDER — BLOOD PRESSURE TEST KIT-LARGE
KIT MISCELLANEOUS
Qty: 1 | Refills: 0 | Status: ACTIVE | OUTPATIENT
Start: 2023-06-22

## 2024-01-29 ENCOUNTER — NON-APPOINTMENT (OUTPATIENT)
Age: 64
End: 2024-01-29

## 2024-01-29 RX ORDER — GLUCAGON 1 MG
1 KIT INJECTION
Qty: 1 | Refills: 3 | Status: ACTIVE | COMMUNITY
Start: 2021-06-10 | End: 1900-01-01

## 2024-01-29 RX ORDER — FLASH GLUCOSE SCANNING READER
EACH MISCELLANEOUS
Qty: 1 | Refills: 0 | Status: ACTIVE | COMMUNITY
Start: 2018-12-04 | End: 1900-01-01

## 2024-01-30 ENCOUNTER — NON-APPOINTMENT (OUTPATIENT)
Age: 64
End: 2024-01-30

## 2024-03-07 ENCOUNTER — APPOINTMENT (OUTPATIENT)
Dept: NEPHROLOGY | Facility: CLINIC | Age: 64
End: 2024-03-07
Payer: COMMERCIAL

## 2024-03-07 ENCOUNTER — APPOINTMENT (OUTPATIENT)
Dept: ENDOCRINOLOGY | Facility: CLINIC | Age: 64
End: 2024-03-07

## 2024-03-07 VITALS — SYSTOLIC BLOOD PRESSURE: 122 MMHG | HEART RATE: 84 BPM | DIASTOLIC BLOOD PRESSURE: 60 MMHG

## 2024-03-07 DIAGNOSIS — E78.5 HYPERLIPIDEMIA, UNSPECIFIED: ICD-10-CM

## 2024-03-07 DIAGNOSIS — E83.39 OTHER DISORDERS OF PHOSPHORUS METABOLISM: ICD-10-CM

## 2024-03-07 DIAGNOSIS — I10 ESSENTIAL (PRIMARY) HYPERTENSION: ICD-10-CM

## 2024-03-07 DIAGNOSIS — D63.8 ANEMIA IN OTHER CHRONIC DISEASES CLASSIFIED ELSEWHERE: ICD-10-CM

## 2024-03-07 DIAGNOSIS — E87.5 HYPERKALEMIA: ICD-10-CM

## 2024-03-07 DIAGNOSIS — N18.30 CHRONIC KIDNEY DISEASE, STAGE 3 UNSPECIFIED: ICD-10-CM

## 2024-03-07 DIAGNOSIS — R80.9 PROTEINURIA, UNSPECIFIED: ICD-10-CM

## 2024-03-07 DIAGNOSIS — E11.9 TYPE 2 DIABETES MELLITUS W/OUT COMPLICATIONS: ICD-10-CM

## 2024-03-07 PROCEDURE — G2211 COMPLEX E/M VISIT ADD ON: CPT

## 2024-03-07 PROCEDURE — 99214 OFFICE O/P EST MOD 30 MIN: CPT

## 2024-03-08 ENCOUNTER — RX RENEWAL (OUTPATIENT)
Age: 64
End: 2024-03-08

## 2024-03-09 PROBLEM — R80.9 PROTEINURIA: Status: ACTIVE | Noted: 2018-03-24

## 2024-03-09 PROBLEM — E83.39 HYPERPHOSPHATEMIA: Status: ACTIVE | Noted: 2024-03-09

## 2024-03-09 PROBLEM — E87.5 HYPERKALEMIA: Status: ACTIVE | Noted: 2024-03-09

## 2024-03-09 NOTE — ASSESSMENT
[FreeTextEntry1] : reviewed all labs in detail with patient  from 1/26/2024 62 yo woman with CKD 3, DMT1, HTN, proteinuria, hyperlipidemia and depression - HTN-  BP controlled; c/w present regimen -CKD 3- creat 1.25, within stable range. -proteinuria- Ualb/creat 15- controlled; c/w RASi -depression- renewed bupropion now -DMT1- A1c 6.1%-  controlled -hyperkalemia- K 5.6- advised to reduce intake of K rich foods- reduce by 1 portion size daily; trend -hyperphosphatemia P 4.7- trend -anemia hgb 11.4- trend; TSat okay check B12, folate with next labs offered that  can add potassium lowering agent if she finds it difficult to limit K intake -HLD- LP controlled; c/w statin  (note: allergic - welts after using novolog- documented page 17/79 in Hx med record dated 12/6/2010)   f/u 4 months

## 2024-03-09 NOTE — HISTORY OF PRESENT ILLNESS
[FreeTextEntry1] : 62 yo woman for follow up evaluation of CKD 3, DMT1, hypertension and  proteinuria. R foot s/p bone shaving 2/2024 L foot, one with more issues has been stable -maintains close f/u with Dr. Llanos. all BPs < 125/75; A1C < 6% continues neuro-ophtho follow up- all stabilized balance issues present but stable Denies headache visual changes nausea vomiting or diarrhea. Denies Dysuria hematuria flank pain or frothy urine.

## 2024-03-09 NOTE — PHYSICAL EXAM
[General Appearance - Alert] : alert [] : no respiratory distress [General Appearance - In No Acute Distress] : in no acute distress [Heart Rate And Rhythm] : heart rate was normal and rhythm regular [Auscultation Breath Sounds / Voice Sounds] : lungs were clear to auscultation bilaterally [Heart Sounds] : normal S1 and S2 [Edema] : there was no peripheral edema [No CVA Tenderness] : no ~M costovertebral angle tenderness [Oriented To Time, Place, And Person] : oriented to person, place, and time [Impaired Insight] : insight and judgment were intact [Affect] : the affect was normal

## 2024-03-22 ENCOUNTER — RX RENEWAL (OUTPATIENT)
Age: 64
End: 2024-03-22

## 2024-03-22 RX ORDER — PEN NEEDLE, DIABETIC 32GX 5/32"
32G X 4 MM NEEDLE, DISPOSABLE MISCELLANEOUS
Qty: 100 | Refills: 3 | Status: ACTIVE | COMMUNITY
Start: 2020-05-26 | End: 1900-01-01

## 2024-05-21 ENCOUNTER — RX RENEWAL (OUTPATIENT)
Age: 64
End: 2024-05-21

## 2024-05-31 RX ORDER — INSULIN ASPART 100 [IU]/ML
100 INJECTION, SOLUTION INTRAVENOUS; SUBCUTANEOUS
Qty: 3 | Refills: 3 | Status: ACTIVE | COMMUNITY
Start: 2024-01-24 | End: 1900-01-01

## 2024-06-10 ENCOUNTER — APPOINTMENT (OUTPATIENT)
Dept: ENDOCRINOLOGY | Facility: CLINIC | Age: 64
End: 2024-06-10
Payer: COMMERCIAL

## 2024-06-10 VITALS
HEIGHT: 63 IN | SYSTOLIC BLOOD PRESSURE: 94 MMHG | HEART RATE: 89 BPM | DIASTOLIC BLOOD PRESSURE: 60 MMHG | WEIGHT: 113 LBS | BODY MASS INDEX: 20.02 KG/M2

## 2024-06-10 LAB — HBA1C MFR BLD HPLC: 6.7

## 2024-06-10 PROCEDURE — 83036 HEMOGLOBIN GLYCOSYLATED A1C: CPT | Mod: QW

## 2024-06-10 PROCEDURE — 99213 OFFICE O/P EST LOW 20 MIN: CPT | Mod: 25

## 2024-06-10 PROCEDURE — 95251 CONT GLUC MNTR ANALYSIS I&R: CPT

## 2024-06-10 RX ORDER — FLASH GLUCOSE SENSOR
KIT MISCELLANEOUS
Qty: 6 | Refills: 3 | Status: ACTIVE | COMMUNITY
Start: 2018-12-04 | End: 1900-01-01

## 2024-06-10 RX ORDER — SYRING-NEEDL,DISP,INSUL,0.3 ML 31GX15/64"
31G X 15/64" SYRINGE, EMPTY DISPOSABLE MISCELLANEOUS
Qty: 400 | Refills: 3 | Status: ACTIVE | COMMUNITY
Start: 2022-09-02 | End: 1900-01-01

## 2024-06-10 RX ORDER — INSULIN ASPART 100 [IU]/ML
100 INJECTION, SOLUTION INTRAVENOUS; SUBCUTANEOUS
Qty: 4 | Refills: 3 | Status: ACTIVE | COMMUNITY
Start: 2024-06-10 | End: 1900-01-01

## 2024-06-10 RX ORDER — INSULIN DEGLUDEC 100 U/ML
100 INJECTION, SOLUTION SUBCUTANEOUS
Qty: 10 | Refills: 3 | Status: ACTIVE | COMMUNITY
Start: 2019-04-01 | End: 1900-01-01

## 2024-06-10 RX ORDER — BLOOD SUGAR DIAGNOSTIC
STRIP MISCELLANEOUS
Qty: 150 | Refills: 5 | Status: ACTIVE | COMMUNITY
Start: 2018-11-29 | End: 1900-01-01

## 2024-06-10 NOTE — ASSESSMENT
[FreeTextEntry1] : 1. T1D A1C goal <7% A1C -  6.7% (6/10/24) from 6.1% (1/26/24) from 6.2% (3/25/23) Current regimen: Tresiba 14 units daily, Humalog ICR 1:16g, ISF 1u:70mg/dL Mariusz 2 reader downloaded and reviewed with patient revealing Intermittent prandial excursion that is typically back to target range within 2 hours, additionally intermittent postprandial hypo.  Filomena prefers to tightly control sugars through vial and syringe to be able to dose in 1/2 units, as needed.  Given intermittent postprandial hypo, reviewed consideration of more conservative ICR, particularly with dinner/before bedtime, which she will trial. -- continue Tresiba 14 units daily -- continue current ICR of 1:16, but will trial to see if 1:18-20 yields continued tight control without hypo, particularly in the evening -- continue ISF of 1:70mg/dL -- continue use of Mariusz 2, contemplative for upgrade, pending auth to Mariusz 3, if can use with reader   Follow-up in 6-12 months with Dr. Adrian or myself  Kat Berrios, MS, [facility], [facility] 06/10/2024

## 2024-06-10 NOTE — DATA REVIEWED
[FreeTextEntry1] : 1/26/24 A1C 6.1% CMP with gluc 82, Creat 1.25, GFR 49, Na+ 126, K+ 5.6, ca 2+ 9.1 iPTH 42 UCR 15 Chol 190, HDL 75, LDL 96, Tg 98 CBC with WBC 6.1, RBC 3.77, H&H 11.4/33.7 vitD 32

## 2024-06-10 NOTE — HISTORY OF PRESENT ILLNESS
[FreeTextEntry1] : DIONE MALDONADO is a 63 year old female with pmhx of T1D, CKD, diabetic retinopathy, HLD, HTN, PAD, osteopenia who presents for T1D follow-up.   Patient of Dr. Adrian, last visit, 4/4/2023   Interval change: Mariusz 2 uploaded and reviewed with patient today Humalog not non-formulary, has received samples in the past.  Plan will cover insulin aspart. Prefers vial and syringes    A1C - 6.7% (6/10/24) from 6.1% (1/26/24) from 6.2% (3/25/23) Office Fingerstick -- not assessed today, 06/10/2024   Current medication: - Tresiba 14 units daily - Humalog ICR 1:16g, ISF 1u:70mg/dL   Past medication: - Novolog, d/c r/t rash   DIONE reports they take their diabetes medication ALL of the time. DIONE endorses intermittent hypoglycemia symptoms or BG <70 and that hypos can take ~30minutes to correct at times   Diabetes Self-Management: CGM Analysis performed on 06/10/2024:  Indication for CGM placement/wear: T1DM Device : Mariusz Sensor placement date:N/A -- personal device Sensor removal date:  N/A -- personal device Date of data download/printout: 06/10/2024   Active CGM wear time: 99% AVG Glucose: 125 Standard deviation: 43.4%   % High: 13% (time above - 3%) % In Range: 76% % Low: 11%- (time below BG 54 - 1%)   CGM Patterns: - Intermittent prandial excursion that is typically back to target range within 2 hours, additionally intermittent postprandial hypo. Clinical recommendations, per A/P section of this office visit note   Diet-- Typically consumes 3 meals/daily, +/- snacks  Ophthalmology: UTD, follows at [facility], +, s/p laser treatment bilaterally Podiatry: [facility], last visit 5/2024 Nephrologist, follows DR Lucia, last visit, March 2024

## 2024-06-10 NOTE — PHYSICAL EXAM
[Alert] : alert [No Acute Distress] : no acute distress [Normal Hearing] : hearing was normal [No Respiratory Distress] : no respiratory distress [Normal Rate and Effort] : normal respiratory rate and effort [Oriented x3] : oriented to person, place, and time [Normal Affect] : the affect was normal [Normal Insight/Judgement] : insight and judgment were intact [Normal Mood] : the mood was normal

## 2024-07-02 ENCOUNTER — APPOINTMENT (OUTPATIENT)
Dept: ENDOCRINOLOGY | Facility: CLINIC | Age: 64
End: 2024-07-02

## 2024-08-13 ENCOUNTER — APPOINTMENT (OUTPATIENT)
Dept: NEPHROLOGY | Facility: CLINIC | Age: 64
End: 2024-08-13

## 2024-09-09 ENCOUNTER — RX RENEWAL (OUTPATIENT)
Age: 64
End: 2024-09-09

## 2024-10-07 ENCOUNTER — LABORATORY RESULT (OUTPATIENT)
Age: 64
End: 2024-10-07

## 2024-10-10 ENCOUNTER — LABORATORY RESULT (OUTPATIENT)
Age: 64
End: 2024-10-10

## 2024-10-10 ENCOUNTER — APPOINTMENT (OUTPATIENT)
Dept: HEART AND VASCULAR | Facility: CLINIC | Age: 64
End: 2024-10-10

## 2024-11-04 ENCOUNTER — LABORATORY RESULT (OUTPATIENT)
Age: 64
End: 2024-11-04

## 2024-11-18 ENCOUNTER — APPOINTMENT (OUTPATIENT)
Dept: NEPHROLOGY | Facility: CLINIC | Age: 64
End: 2024-11-18
Payer: COMMERCIAL

## 2024-11-18 DIAGNOSIS — N18.30 CHRONIC KIDNEY DISEASE, STAGE 3 UNSPECIFIED: ICD-10-CM

## 2024-11-18 DIAGNOSIS — E87.5 HYPERKALEMIA: ICD-10-CM

## 2024-11-18 DIAGNOSIS — E11.9 TYPE 2 DIABETES MELLITUS W/OUT COMPLICATIONS: ICD-10-CM

## 2024-11-18 DIAGNOSIS — R80.9 PROTEINURIA, UNSPECIFIED: ICD-10-CM

## 2024-11-18 DIAGNOSIS — I10 ESSENTIAL (PRIMARY) HYPERTENSION: ICD-10-CM

## 2024-11-18 PROCEDURE — 99443: CPT

## 2024-11-20 ENCOUNTER — RX RENEWAL (OUTPATIENT)
Age: 64
End: 2024-11-20

## 2024-12-17 ENCOUNTER — APPOINTMENT (OUTPATIENT)
Dept: ENDOCRINOLOGY | Facility: CLINIC | Age: 64
End: 2024-12-17
Payer: COMMERCIAL

## 2024-12-17 DIAGNOSIS — E10.8 TYPE 1 DIABETES MELLITUS WITH UNSPECIFIED COMPLICATIONS: ICD-10-CM

## 2024-12-17 DIAGNOSIS — M81.0 AGE-RELATED OSTEOPOROSIS W/OUT CURRENT PATHOLOGICAL FRACTURE: ICD-10-CM

## 2024-12-17 PROCEDURE — 99214 OFFICE O/P EST MOD 30 MIN: CPT

## 2025-01-14 ENCOUNTER — NON-APPOINTMENT (OUTPATIENT)
Age: 65
End: 2025-01-14

## 2025-01-31 ENCOUNTER — RX RENEWAL (OUTPATIENT)
Age: 65
End: 2025-01-31

## 2025-03-07 ENCOUNTER — NON-APPOINTMENT (OUTPATIENT)
Age: 65
End: 2025-03-07

## 2025-03-08 ENCOUNTER — RX RENEWAL (OUTPATIENT)
Age: 65
End: 2025-03-08

## 2025-03-11 RX ORDER — INSULIN DEGLUDEC INJECTION 100 U/ML
100 INJECTION, SOLUTION SUBCUTANEOUS
Qty: 15 | Refills: 2 | Status: ACTIVE | COMMUNITY
Start: 2025-03-07 | End: 1900-01-01

## 2025-04-17 ENCOUNTER — APPOINTMENT (OUTPATIENT)
Dept: ENDOCRINOLOGY | Facility: CLINIC | Age: 65
End: 2025-04-17
Payer: COMMERCIAL

## 2025-04-17 VITALS
SYSTOLIC BLOOD PRESSURE: 109 MMHG | WEIGHT: 112 LBS | HEART RATE: 99 BPM | BODY MASS INDEX: 19.84 KG/M2 | DIASTOLIC BLOOD PRESSURE: 64 MMHG

## 2025-04-17 DIAGNOSIS — M81.0 AGE-RELATED OSTEOPOROSIS W/OUT CURRENT PATHOLOGICAL FRACTURE: ICD-10-CM

## 2025-04-17 DIAGNOSIS — E10.8 TYPE 1 DIABETES MELLITUS WITH UNSPECIFIED COMPLICATIONS: ICD-10-CM

## 2025-04-17 LAB
GLUCOSE BLDC GLUCOMTR-MCNC: 179
HBA1C MFR BLD HPLC: 6.7

## 2025-04-17 PROCEDURE — 83036 HEMOGLOBIN GLYCOSYLATED A1C: CPT | Mod: QW

## 2025-04-17 PROCEDURE — 99214 OFFICE O/P EST MOD 30 MIN: CPT | Mod: 25

## 2025-04-17 PROCEDURE — 82962 GLUCOSE BLOOD TEST: CPT

## 2025-04-22 ENCOUNTER — APPOINTMENT (OUTPATIENT)
Dept: NEPHROLOGY | Facility: CLINIC | Age: 65
End: 2025-04-22

## 2025-05-06 ENCOUNTER — APPOINTMENT (OUTPATIENT)
Dept: NEPHROLOGY | Facility: CLINIC | Age: 65
End: 2025-05-06
Payer: COMMERCIAL

## 2025-05-06 DIAGNOSIS — R82.81 PYURIA: ICD-10-CM

## 2025-05-06 DIAGNOSIS — N39.0 URINARY TRACT INFECTION, SITE NOT SPECIFIED: ICD-10-CM

## 2025-05-06 DIAGNOSIS — E87.5 HYPERKALEMIA: ICD-10-CM

## 2025-05-06 DIAGNOSIS — E11.9 TYPE 2 DIABETES MELLITUS W/OUT COMPLICATIONS: ICD-10-CM

## 2025-05-06 DIAGNOSIS — R80.9 PROTEINURIA, UNSPECIFIED: ICD-10-CM

## 2025-05-06 DIAGNOSIS — N18.30 CHRONIC KIDNEY DISEASE, STAGE 3 UNSPECIFIED: ICD-10-CM

## 2025-05-06 DIAGNOSIS — E78.5 HYPERLIPIDEMIA, UNSPECIFIED: ICD-10-CM

## 2025-05-06 PROCEDURE — 99214 OFFICE O/P EST MOD 30 MIN: CPT

## 2025-05-06 PROCEDURE — G2211 COMPLEX E/M VISIT ADD ON: CPT

## 2025-05-06 RX ORDER — EZETIMIBE 10 MG/1
10 TABLET ORAL
Refills: 0 | Status: ACTIVE | COMMUNITY

## 2025-05-07 VITALS — HEART RATE: 84 BPM | SYSTOLIC BLOOD PRESSURE: 106 MMHG | DIASTOLIC BLOOD PRESSURE: 66 MMHG

## 2025-05-07 LAB
APPEARANCE: CLEAR
BACTERIA: ABNORMAL /HPF
BILIRUBIN URINE: NEGATIVE
BLOOD URINE: NEGATIVE
CAST: 0 /LPF
COLOR: YELLOW
EPITHELIAL CELLS: 1 /HPF
GLUCOSE QUALITATIVE U: NEGATIVE MG/DL
KETONES URINE: NEGATIVE MG/DL
LEUKOCYTE ESTERASE URINE: ABNORMAL
MICROSCOPIC-UA: NORMAL
NITRITE URINE: NEGATIVE
PH URINE: 6.5
PROTEIN URINE: NEGATIVE MG/DL
RED BLOOD CELLS URINE: 1 /HPF
SPECIFIC GRAVITY URINE: 1.01
UROBILINOGEN URINE: 0.2 MG/DL
WHITE BLOOD CELLS URINE: 10 /HPF

## 2025-05-12 DIAGNOSIS — N39.0 URINARY TRACT INFECTION, SITE NOT SPECIFIED: ICD-10-CM

## 2025-05-12 LAB — BACTERIA UR CULT: ABNORMAL

## 2025-05-12 RX ORDER — CIPROFLOXACIN HYDROCHLORIDE 500 MG/1
500 TABLET, FILM COATED ORAL
Qty: 14 | Refills: 1 | Status: ACTIVE | COMMUNITY
Start: 2025-05-12 | End: 1900-01-01

## 2025-05-13 PROBLEM — N39.0 ACUTE UTI: Status: ACTIVE | Noted: 2025-05-13 | Resolved: 2025-06-11

## 2025-05-13 RX ORDER — NITROFURANTOIN MACROCRYSTALS 50 MG/1
50 CAPSULE ORAL
Qty: 14 | Refills: 1 | Status: ACTIVE | COMMUNITY
Start: 2025-05-13 | End: 1900-01-01

## 2025-05-15 ENCOUNTER — NON-APPOINTMENT (OUTPATIENT)
Age: 65
End: 2025-05-15

## 2025-05-19 DIAGNOSIS — M81.0 AGE-RELATED OSTEOPOROSIS W/OUT CURRENT PATHOLOGICAL FRACTURE: ICD-10-CM

## 2025-05-22 ENCOUNTER — RX RENEWAL (OUTPATIENT)
Age: 65
End: 2025-05-22

## 2025-06-24 ENCOUNTER — NON-APPOINTMENT (OUTPATIENT)
Age: 65
End: 2025-06-24

## 2025-07-09 ENCOUNTER — NON-APPOINTMENT (OUTPATIENT)
Age: 65
End: 2025-07-09

## 2025-08-06 ENCOUNTER — RX RENEWAL (OUTPATIENT)
Age: 65
End: 2025-08-06

## 2025-08-06 RX ORDER — SYRING-NEEDL,DISP,INSUL,0.3 ML 31GX15/64"
31G X 15/64" SYRINGE, EMPTY DISPOSABLE MISCELLANEOUS
Qty: 100 | Refills: 3 | Status: ACTIVE | COMMUNITY
Start: 2025-08-06 | End: 1900-01-01

## 2025-08-14 ENCOUNTER — NON-APPOINTMENT (OUTPATIENT)
Age: 65
End: 2025-08-14